# Patient Record
Sex: FEMALE | NOT HISPANIC OR LATINO
[De-identification: names, ages, dates, MRNs, and addresses within clinical notes are randomized per-mention and may not be internally consistent; named-entity substitution may affect disease eponyms.]

---

## 2017-08-16 ENCOUNTER — MYAURORA ACCOUNT LINK (OUTPATIENT)
Dept: OTHER | Age: 57
End: 2017-08-16

## 2017-08-16 ENCOUNTER — CHARTING TRANS (OUTPATIENT)
Dept: URGENT CARE | Age: 57
End: 2017-08-16

## 2017-08-16 ENCOUNTER — IMAGING SERVICES (OUTPATIENT)
Dept: OTHER | Age: 57
End: 2017-08-16

## 2017-08-16 ASSESSMENT — PAIN SCALES - GENERAL: PAINLEVEL_OUTOF10: 8

## 2018-09-06 ENCOUNTER — CHARTING TRANS (OUTPATIENT)
Dept: OTHER | Age: 58
End: 2018-09-06

## 2018-09-11 ENCOUNTER — CHARTING TRANS (OUTPATIENT)
Dept: OTHER | Age: 58
End: 2018-09-11

## 2018-09-14 ENCOUNTER — MYAURORA ACCOUNT LINK (OUTPATIENT)
Dept: OTHER | Age: 58
End: 2018-09-14

## 2018-09-17 ENCOUNTER — CHARTING TRANS (OUTPATIENT)
Dept: OTHER | Age: 58
End: 2018-09-17

## 2018-09-19 ENCOUNTER — MYAURORA ACCOUNT LINK (OUTPATIENT)
Dept: OTHER | Age: 58
End: 2018-09-19

## 2018-09-19 ENCOUNTER — CHARTING TRANS (OUTPATIENT)
Dept: OTHER | Age: 58
End: 2018-09-19

## 2018-09-19 ASSESSMENT — PAIN SCALES - GENERAL: PAINLEVEL_OUTOF10: 5

## 2018-09-20 ENCOUNTER — CHARTING TRANS (OUTPATIENT)
Dept: OTHER | Age: 58
End: 2018-09-20

## 2018-11-23 ENCOUNTER — IMAGING SERVICES (OUTPATIENT)
Dept: OTHER | Age: 58
End: 2018-11-23

## 2018-11-28 VITALS
TEMPERATURE: 100 F | OXYGEN SATURATION: 98 % | SYSTOLIC BLOOD PRESSURE: 121 MMHG | RESPIRATION RATE: 16 BRPM | HEART RATE: 66 BPM | DIASTOLIC BLOOD PRESSURE: 74 MMHG

## 2019-03-01 ENCOUNTER — APPOINTMENT (OUTPATIENT)
Dept: LAB | Age: 59
End: 2019-03-01
Attending: NURSE PRACTITIONER
Payer: COMMERCIAL

## 2019-03-01 DIAGNOSIS — F41.1 GAD (GENERALIZED ANXIETY DISORDER): ICD-10-CM

## 2019-03-01 LAB
ATRIAL RATE: 65 BPM
P AXIS: -13 DEGREES
P-R INTERVAL: 132 MS
Q-T INTERVAL: 388 MS
QRS DURATION: 88 MS
QTC CALCULATION (BEZET): 403 MS
R AXIS: 79 DEGREES
T AXIS: 56 DEGREES
VENTRICULAR RATE: 65 BPM

## 2019-03-01 PROCEDURE — 93005 ELECTROCARDIOGRAM TRACING: CPT

## 2019-03-01 PROCEDURE — 93010 ELECTROCARDIOGRAM REPORT: CPT | Performed by: INTERNAL MEDICINE

## 2019-03-04 NOTE — PROGRESS NOTES
Results reviewed. Tests show no significant abnormalities. Please inform patient that her EKG was normal.  I have released it to my chart.

## 2019-03-07 PROBLEM — R92.0 MAMMOGRAPHIC MICROCALCIFICATION: Status: ACTIVE | Noted: 2018-07-23

## 2019-03-07 PROBLEM — C50.512 MALIGNANT NEOPLASM OF LOWER-OUTER QUADRANT OF LEFT BREAST OF FEMALE, ESTROGEN RECEPTOR POSITIVE (HCC): Status: ACTIVE | Noted: 2018-08-01

## 2019-03-07 PROBLEM — C50.912 LOBULAR CARCINOMA OF LEFT BREAST (HCC): Status: ACTIVE | Noted: 2018-09-14

## 2019-03-07 PROBLEM — M79.7 FIBROMYALGIA: Status: ACTIVE | Noted: 2019-03-07

## 2019-03-07 PROBLEM — M81.0 OSTEOPOROSIS: Status: ACTIVE | Noted: 2019-03-07

## 2019-03-07 PROBLEM — C50.919 MALIGNANT NEOPLASM OF BREAST (HCC): Status: ACTIVE | Noted: 2019-03-07

## 2019-03-07 PROBLEM — N63.0 LUMP OR MASS IN BREAST: Status: ACTIVE | Noted: 2018-07-23

## 2019-03-07 PROBLEM — Z17.0 MALIGNANT NEOPLASM OF LOWER-OUTER QUADRANT OF LEFT BREAST OF FEMALE, ESTROGEN RECEPTOR POSITIVE (HCC): Status: ACTIVE | Noted: 2018-08-01

## 2021-10-22 PROBLEM — R55 SYNCOPE: Status: ACTIVE | Noted: 2020-12-04

## 2021-10-22 PROBLEM — E78.5 HYPERLIPIDEMIA: Status: ACTIVE | Noted: 2021-10-13

## 2021-10-22 PROBLEM — E03.9 ACQUIRED HYPOTHYROIDISM: Status: ACTIVE | Noted: 2020-12-04

## 2022-01-12 PROBLEM — C50.912 LOBULAR CARCINOMA OF LEFT BREAST (HCC): Status: RESOLVED | Noted: 2018-09-14 | Resolved: 2022-01-12

## 2022-01-12 PROBLEM — R92.0 MAMMOGRAPHIC MICROCALCIFICATION: Status: RESOLVED | Noted: 2018-07-23 | Resolved: 2022-01-12

## 2022-01-12 PROBLEM — F41.9 ANXIETY DISORDER, UNSPECIFIED: Status: ACTIVE | Noted: 2022-01-12

## 2022-01-12 PROBLEM — Z17.0 MALIGNANT NEOPLASM OF LOWER-OUTER QUADRANT OF LEFT BREAST OF FEMALE, ESTROGEN RECEPTOR POSITIVE (HCC): Status: RESOLVED | Noted: 2018-08-01 | Resolved: 2022-01-12

## 2022-01-12 PROBLEM — F17.200 SMOKER: Status: ACTIVE | Noted: 2022-01-12

## 2022-01-12 PROBLEM — C50.512 MALIGNANT NEOPLASM OF LOWER-OUTER QUADRANT OF LEFT BREAST OF FEMALE, ESTROGEN RECEPTOR POSITIVE (HCC): Status: RESOLVED | Noted: 2018-08-01 | Resolved: 2022-01-12

## 2022-01-12 PROBLEM — N63.0 LUMP OR MASS IN BREAST: Status: RESOLVED | Noted: 2018-07-23 | Resolved: 2022-01-12

## 2022-04-30 ENCOUNTER — APPOINTMENT (OUTPATIENT)
Dept: GENERAL RADIOLOGY | Age: 62
End: 2022-04-30
Attending: NURSE PRACTITIONER
Payer: COMMERCIAL

## 2022-04-30 ENCOUNTER — HOSPITAL ENCOUNTER (OUTPATIENT)
Age: 62
Discharge: HOME | End: 2022-04-30
Payer: COMMERCIAL

## 2022-04-30 VITALS
HEIGHT: 62 IN | WEIGHT: 150 LBS | TEMPERATURE: 97 F | HEART RATE: 79 BPM | BODY MASS INDEX: 27.6 KG/M2 | OXYGEN SATURATION: 96 % | DIASTOLIC BLOOD PRESSURE: 78 MMHG | RESPIRATION RATE: 18 BRPM | SYSTOLIC BLOOD PRESSURE: 131 MMHG

## 2022-04-30 DIAGNOSIS — S90.121A CONTUSION OF LESSER TOE OF RIGHT FOOT WITHOUT DAMAGE TO NAIL, INITIAL ENCOUNTER: Primary | ICD-10-CM

## 2022-04-30 PROCEDURE — 99213 OFFICE O/P EST LOW 20 MIN: CPT | Performed by: NURSE PRACTITIONER

## 2022-04-30 PROCEDURE — 73660 X-RAY EXAM OF TOE(S): CPT | Performed by: NURSE PRACTITIONER

## 2023-02-10 PROBLEM — F43.21 GRIEF AT LOSS OF CHILD: Status: ACTIVE | Noted: 2023-02-10

## 2023-02-10 PROBLEM — Z63.4 GRIEF AT LOSS OF CHILD: Status: ACTIVE | Noted: 2023-02-10

## 2023-02-10 PROBLEM — R45.851 SUICIDAL THOUGHTS: Status: ACTIVE | Noted: 2023-02-10

## 2023-03-14 PROBLEM — Z91.199 PATIENT NON ADHERENCE: Status: ACTIVE | Noted: 2023-03-14

## 2023-04-26 PROBLEM — E78.00 ELEVATED LDL CHOLESTEROL LEVEL: Status: ACTIVE | Noted: 2021-10-13

## 2024-12-19 ENCOUNTER — LAB ENCOUNTER (OUTPATIENT)
Dept: LAB | Age: 64
End: 2024-12-19
Attending: NURSE PRACTITIONER
Payer: COMMERCIAL

## 2024-12-19 DIAGNOSIS — F41.1 GAD (GENERALIZED ANXIETY DISORDER): ICD-10-CM

## 2024-12-19 LAB
T4 FREE SERPL-MCNC: 1.8 NG/DL (ref 0.8–1.7)
TSI SER-ACNC: 0.07 UIU/ML (ref 0.55–4.78)

## 2024-12-19 PROCEDURE — 36415 COLL VENOUS BLD VENIPUNCTURE: CPT

## 2024-12-19 PROCEDURE — 84439 ASSAY OF FREE THYROXINE: CPT

## 2024-12-19 PROCEDURE — 84443 ASSAY THYROID STIM HORMONE: CPT
